# Patient Record
Sex: FEMALE | Race: WHITE | URBAN - METROPOLITAN AREA
[De-identification: names, ages, dates, MRNs, and addresses within clinical notes are randomized per-mention and may not be internally consistent; named-entity substitution may affect disease eponyms.]

---

## 2021-10-05 LAB
ABO/RH: NORMAL
ANTIBODY SCREEN: NORMAL
HEPATITIS C ANTIBODY INTERPRETATION: NORMAL

## 2021-10-06 LAB
BASOPHILS ABSOLUTE: 0 K/UL (ref 0–0.2)
BASOPHILS RELATIVE PERCENT: 0.2 %
EOSINOPHILS ABSOLUTE: 0 K/UL (ref 0–0.6)
EOSINOPHILS RELATIVE PERCENT: 0.4 %
HCT VFR BLD CALC: 43.7 % (ref 36–48)
HEMOGLOBIN: 14.6 G/DL (ref 12–16)
HEPATITIS B SURFACE ANTIGEN INTERPRETATION: ABNORMAL
HIV AG/AB: NORMAL
HIV ANTIGEN: NORMAL
HIV-1 ANTIBODY: NORMAL
HIV-2 AB: NORMAL
LYMPHOCYTES ABSOLUTE: 2.3 K/UL (ref 1–5.1)
LYMPHOCYTES RELATIVE PERCENT: 19.6 %
MCH RBC QN AUTO: 30.4 PG (ref 26–34)
MCHC RBC AUTO-ENTMCNC: 33.3 G/DL (ref 31–36)
MCV RBC AUTO: 91.2 FL (ref 80–100)
MONOCYTES ABSOLUTE: 0.9 K/UL (ref 0–1.3)
MONOCYTES RELATIVE PERCENT: 8 %
NEUTROPHILS ABSOLUTE: 8.3 K/UL (ref 1.7–7.7)
NEUTROPHILS RELATIVE PERCENT: 71.8 %
PDW BLD-RTO: 12 % (ref 12.4–15.4)
PLATELET # BLD: 179 K/UL (ref 135–450)
PMV BLD AUTO: 9.2 FL (ref 5–10.5)
RBC # BLD: 4.79 M/UL (ref 4–5.2)
RUBELLA ANTIBODY IGG: 153 IU/ML
TOTAL SYPHILLIS IGG/IGM: ABNORMAL
URINE CULTURE, ROUTINE: NORMAL
WBC # BLD: 11.6 K/UL (ref 4–11)

## 2022-05-05 LAB — GBS, EXTERNAL RESULT: NOT DETECTED

## 2022-05-26 ENCOUNTER — ANESTHESIA (OUTPATIENT)
Dept: LABOR AND DELIVERY | Age: 22
End: 2022-05-26

## 2022-05-26 ENCOUNTER — ANESTHESIA EVENT (OUTPATIENT)
Dept: LABOR AND DELIVERY | Age: 22
End: 2022-05-26
Payer: COMMERCIAL

## 2022-05-26 ENCOUNTER — HOSPITAL ENCOUNTER (INPATIENT)
Age: 22
LOS: 2 days | Discharge: HOME | End: 2022-05-28
Attending: OBSTETRICS & GYNECOLOGY | Admitting: OBSTETRICS & GYNECOLOGY
Payer: COMMERCIAL

## 2022-05-26 ENCOUNTER — ANESTHESIA EVENT (OUTPATIENT)
Dept: LABOR AND DELIVERY | Age: 22
End: 2022-05-26

## 2022-05-26 ENCOUNTER — ANESTHESIA (OUTPATIENT)
Dept: LABOR AND DELIVERY | Age: 22
End: 2022-05-26
Payer: COMMERCIAL

## 2022-05-26 PROBLEM — Z37.9 NORMAL LABOR: Status: ACTIVE | Noted: 2022-05-26

## 2022-05-26 LAB
ABO/RH: NORMAL
ABO/RH: NORMAL
AMPHETAMINE SCREEN, URINE: NORMAL
ANTIBODY SCREEN: NORMAL
BARBITURATE SCREEN URINE: NORMAL
BASOPHILS ABSOLUTE: 0 K/UL (ref 0–0.2)
BASOPHILS RELATIVE PERCENT: 0.3 %
BENZODIAZEPINE SCREEN, URINE: NORMAL
BUPRENORPHINE URINE: NORMAL
CANNABINOID SCREEN URINE: NORMAL
COCAINE METABOLITE SCREEN URINE: NORMAL
EOSINOPHILS ABSOLUTE: 0.1 K/UL (ref 0–0.6)
EOSINOPHILS RELATIVE PERCENT: 0.5 %
HCT VFR BLD CALC: 34.5 % (ref 36–48)
HEMOGLOBIN: 11.2 G/DL (ref 12–16)
LYMPHOCYTES ABSOLUTE: 1.6 K/UL (ref 1–5.1)
LYMPHOCYTES RELATIVE PERCENT: 12.5 %
Lab: NORMAL
MCH RBC QN AUTO: 28 PG (ref 26–34)
MCHC RBC AUTO-ENTMCNC: 32.6 G/DL (ref 31–36)
MCV RBC AUTO: 85.8 FL (ref 80–100)
METHADONE SCREEN, URINE: NORMAL
MONOCYTES ABSOLUTE: 1.2 K/UL (ref 0–1.3)
MONOCYTES RELATIVE PERCENT: 9.4 %
NEUTROPHILS ABSOLUTE: 10.2 K/UL (ref 1.7–7.7)
NEUTROPHILS RELATIVE PERCENT: 77.3 %
OPIATE SCREEN URINE: NORMAL
OXYCODONE URINE: NORMAL
PDW BLD-RTO: 13.5 % (ref 12.4–15.4)
PH UA: 6
PHENCYCLIDINE SCREEN URINE: NORMAL
PLATELET # BLD: 160 K/UL (ref 135–450)
PMV BLD AUTO: 9.6 FL (ref 5–10.5)
PROPOXYPHENE SCREEN: NORMAL
RBC # BLD: 4.02 M/UL (ref 4–5.2)
WBC # BLD: 13.2 K/UL (ref 4–11)

## 2022-05-26 PROCEDURE — 86850 RBC ANTIBODY SCREEN: CPT

## 2022-05-26 PROCEDURE — 86900 BLOOD TYPING SEROLOGIC ABO: CPT

## 2022-05-26 PROCEDURE — 1220000000 HC SEMI PRIVATE OB R&B

## 2022-05-26 PROCEDURE — 6370000000 HC RX 637 (ALT 250 FOR IP): Performed by: OBSTETRICS & GYNECOLOGY

## 2022-05-26 PROCEDURE — 3700000025 EPIDURAL BLOCK: Performed by: ANESTHESIOLOGY

## 2022-05-26 PROCEDURE — 85025 COMPLETE CBC W/AUTO DIFF WBC: CPT

## 2022-05-26 PROCEDURE — 86780 TREPONEMA PALLIDUM: CPT

## 2022-05-26 PROCEDURE — 2580000003 HC RX 258: Performed by: OBSTETRICS & GYNECOLOGY

## 2022-05-26 PROCEDURE — 2500000003 HC RX 250 WO HCPCS: Performed by: NURSE ANESTHETIST, CERTIFIED REGISTERED

## 2022-05-26 PROCEDURE — 2500000003 HC RX 250 WO HCPCS: Performed by: ANESTHESIOLOGY

## 2022-05-26 PROCEDURE — 80307 DRUG TEST PRSMV CHEM ANLYZR: CPT

## 2022-05-26 PROCEDURE — 6360000002 HC RX W HCPCS: Performed by: OBSTETRICS & GYNECOLOGY

## 2022-05-26 PROCEDURE — 86901 BLOOD TYPING SEROLOGIC RH(D): CPT

## 2022-05-26 RX ORDER — SODIUM CHLORIDE, SODIUM LACTATE, POTASSIUM CHLORIDE, CALCIUM CHLORIDE 600; 310; 30; 20 MG/100ML; MG/100ML; MG/100ML; MG/100ML
INJECTION, SOLUTION INTRAVENOUS CONTINUOUS
Status: DISCONTINUED | OUTPATIENT
Start: 2022-05-26 | End: 2022-05-27

## 2022-05-26 RX ORDER — DOCUSATE SODIUM 100 MG/1
100 CAPSULE, LIQUID FILLED ORAL 2 TIMES DAILY
Status: DISCONTINUED | OUTPATIENT
Start: 2022-05-26 | End: 2022-05-27

## 2022-05-26 RX ORDER — SODIUM CHLORIDE, SODIUM LACTATE, POTASSIUM CHLORIDE, AND CALCIUM CHLORIDE .6; .31; .03; .02 G/100ML; G/100ML; G/100ML; G/100ML
500 INJECTION, SOLUTION INTRAVENOUS PRN
Status: DISCONTINUED | OUTPATIENT
Start: 2022-05-26 | End: 2022-05-27

## 2022-05-26 RX ORDER — SODIUM CHLORIDE, SODIUM LACTATE, POTASSIUM CHLORIDE, AND CALCIUM CHLORIDE .6; .31; .03; .02 G/100ML; G/100ML; G/100ML; G/100ML
1000 INJECTION, SOLUTION INTRAVENOUS PRN
Status: DISCONTINUED | OUTPATIENT
Start: 2022-05-26 | End: 2022-05-27

## 2022-05-26 RX ORDER — BUTORPHANOL TARTRATE 1 MG/ML
1 INJECTION, SOLUTION INTRAMUSCULAR; INTRAVENOUS ONCE
Status: COMPLETED | OUTPATIENT
Start: 2022-05-26 | End: 2022-05-26

## 2022-05-26 RX ORDER — LIDOCAINE HYDROCHLORIDE AND EPINEPHRINE 15; 5 MG/ML; UG/ML
INJECTION, SOLUTION EPIDURAL PRN
Status: DISCONTINUED | OUTPATIENT
Start: 2022-05-26 | End: 2022-05-27 | Stop reason: SDUPTHER

## 2022-05-26 RX ORDER — ONDANSETRON 2 MG/ML
4 INJECTION INTRAMUSCULAR; INTRAVENOUS EVERY 6 HOURS PRN
Status: DISCONTINUED | OUTPATIENT
Start: 2022-05-26 | End: 2022-05-27

## 2022-05-26 RX ORDER — SODIUM CHLORIDE 9 MG/ML
25 INJECTION, SOLUTION INTRAVENOUS PRN
Status: DISCONTINUED | OUTPATIENT
Start: 2022-05-26 | End: 2022-05-27

## 2022-05-26 RX ORDER — SODIUM CHLORIDE 0.9 % (FLUSH) 0.9 %
5-40 SYRINGE (ML) INJECTION EVERY 12 HOURS SCHEDULED
Status: DISCONTINUED | OUTPATIENT
Start: 2022-05-26 | End: 2022-05-27

## 2022-05-26 RX ORDER — SODIUM CHLORIDE 0.9 % (FLUSH) 0.9 %
5-40 SYRINGE (ML) INJECTION PRN
Status: DISCONTINUED | OUTPATIENT
Start: 2022-05-26 | End: 2022-05-27

## 2022-05-26 RX ORDER — NALOXONE HYDROCHLORIDE 0.4 MG/ML
INJECTION, SOLUTION INTRAMUSCULAR; INTRAVENOUS; SUBCUTANEOUS PRN
Status: DISCONTINUED | OUTPATIENT
Start: 2022-05-26 | End: 2022-05-27

## 2022-05-26 RX ADMIN — SODIUM CHLORIDE, POTASSIUM CHLORIDE, SODIUM LACTATE AND CALCIUM CHLORIDE: 600; 310; 30; 20 INJECTION, SOLUTION INTRAVENOUS at 17:05

## 2022-05-26 RX ADMIN — SODIUM CHLORIDE, POTASSIUM CHLORIDE, SODIUM LACTATE AND CALCIUM CHLORIDE: 600; 310; 30; 20 INJECTION, SOLUTION INTRAVENOUS at 09:36

## 2022-05-26 RX ADMIN — Medication 12 ML/HR: at 20:03

## 2022-05-26 RX ADMIN — Medication 25 MCG: at 13:47

## 2022-05-26 RX ADMIN — SODIUM CHLORIDE, POTASSIUM CHLORIDE, SODIUM LACTATE AND CALCIUM CHLORIDE: 600; 310; 30; 20 INJECTION, SOLUTION INTRAVENOUS at 21:47

## 2022-05-26 RX ADMIN — LIDOCAINE HYDROCHLORIDE AND EPINEPHRINE 3 ML: 15; 5 INJECTION, SOLUTION EPIDURAL at 19:54

## 2022-05-26 RX ADMIN — Medication 25 MCG: at 09:37

## 2022-05-26 RX ADMIN — BUTORPHANOL TARTRATE 1 MG: 1 INJECTION, SOLUTION INTRAMUSCULAR; INTRAVENOUS at 18:15

## 2022-05-26 RX ADMIN — LIDOCAINE HYDROCHLORIDE AND EPINEPHRINE 2 ML: 15; 5 INJECTION, SOLUTION EPIDURAL at 20:00

## 2022-05-26 ASSESSMENT — PAIN DESCRIPTION - DESCRIPTORS
DESCRIPTORS: CRAMPING
DESCRIPTORS: CRAMPING;DISCOMFORT;PRESSURE
DESCRIPTORS: OTHER (COMMENT)
DESCRIPTORS: CRAMPING

## 2022-05-26 ASSESSMENT — ENCOUNTER SYMPTOMS: SHORTNESS OF BREATH: 0

## 2022-05-26 ASSESSMENT — PAIN SCALES - GENERAL: PAINLEVEL_OUTOF10: 8

## 2022-05-26 NOTE — H&P
Department of Obstetrics and Gynecology   Obstetrics History and Physical        CHIEF COMPLAINT:  Here for IOL    HISTORY OF PRESENT ILLNESS:      The patient is a 24 y.o. female at 39w0d. OB History        1    Para        Term                AB        Living           SAB        IAB        Ectopic        Molar        Multiple        Live Births                Patient presents with a chief complaint as above and is being admitted for induction    Estimated Due Date: Estimated Date of Delivery: 22    PRENATAL CARE:    Complicated by: none    PAST OB HISTORY:  OB History        1    Para        Term                AB        Living           SAB        IAB        Ectopic        Molar        Multiple        Live Births                    Past Medical History:    History reviewed. No pertinent past medical history. Past Surgical History:    History reviewed. No pertinent surgical history. Allergies:  Patient has no known allergies. Social History:    Social History     Socioeconomic History    Marital status: Single     Spouse name: Not on file    Number of children: Not on file    Years of education: Not on file    Highest education level: Not on file   Occupational History    Not on file   Tobacco Use    Smoking status: Never Smoker    Smokeless tobacco: Never Used   Vaping Use    Vaping Use: Former   Substance and Sexual Activity    Alcohol use: No    Drug use: No    Sexual activity: Yes     Partners: Male   Other Topics Concern    Not on file   Social History Narrative    Not on file     Social Determinants of Health     Financial Resource Strain:     Difficulty of Paying Living Expenses: Not on file   Food Insecurity:     Worried About Running Out of Food in the Last Year: Not on file    [place] of Food in the Last Year: Not on file   Transportation Needs:     Lack of Transportation (Medical): Not on file    Lack of Transportation (Non-Medical):  Not on file Physical Activity:     Days of Exercise per Week: Not on file    Minutes of Exercise per Session: Not on file   Stress:     Feeling of Stress : Not on file   Social Connections:     Frequency of Communication with Friends and Family: Not on file    Frequency of Social Gatherings with Friends and Family: Not on file    Attends Sabianist Services: Not on file    Active Member of [address] [place] [address] or Organizations: Not on file    Attends Club or Organization Meetings: Not on file    Marital Status: Not on file   Intimate Partner Violence:     Fear of Current or Ex-Partner: Not on file    Emotionally Abused: Not on file    Physically Abused: Not on file    Sexually Abused: Not on file   Housing Stability:     Unable to Pay for Housing in the Last Year: Not on file    Number of [place] in the Last Year: Not on file    Unstable Housing in the Last Year: Not on file     Family History:       Problem Relation Age of Onset    Asthma Father     Obesity Father      Medications Prior to Admission:  Medications Prior to Admission: ondansetron (ZOFRAN ODT) 4 MG disintegrating tablet, Take 1 tablet by mouth every 8 hours as needed for Nausea (Patient not taking: Reported on 5/26/2022)    REVIEW OF SYSTEMS:        PHYSICAL EXAM:  Vitals:    05/26/22 0816 05/26/22 0818 05/26/22 0852   BP: 122/82  130/83   Pulse: (!) 107  93   Resp: 17  18   Temp: 97.3 °F (36.3 °C)  98 °F (36.7 °C)   TempSrc: Temporal  Oral   SpO2: 97%  98%   Weight:  155 lb (70.3 kg)    Height:  5' 2\" (1.575 m)      General appearance:  awake, alert, cooperative, no apparent distress, and appears stated age  Neurologic:  Awake, alert, oriented to name, place and time. Lungs:  No increased work of breathing, good air exchange  Abdomen:  Soft, non tender, gravid, consistent with her gestational age, EFW by Leopold's maneuver was 3100 gms   Fetal heart rate:  Reassuring.   Pelvis:  Adequate pelvis  Cervix: closed/50  Contraction frequency:    Membranes: Intact    Labs:   CBC:   Lab Results   Component Value Date    WBC 14.1 02/07/2022    WBC 11.6 10/05/2021    RBC 3.83 02/07/2022    HGB 11.7 02/07/2022    HCT 34.9 02/07/2022    MCV 91.1 02/07/2022    MCH 30.5 02/07/2022    MCHC 33.5 02/07/2022    RDW 12.0 02/07/2022     02/07/2022    MPV 11.9 02/07/2022       ASSESSMENT AND PLAN:    IOL:  Admit, start with cervical ripening( Cytotec), anticipate normal delivery, routine labor orders  Fetus: Reassuring  GBS: No  Other: epidural

## 2022-05-26 NOTE — ANESTHESIA PRE PROCEDURE
Department of Anesthesiology  Preprocedure Note       Name:  Bruno Morgan   Age:  24 y.o.  :  2000                                          MRN:  [id number]         Date:  2022      Surgeon: * No surgeons listed *    Procedure: * No procedures listed *    Medications prior to admission:   Prior to Admission medications    Medication Sig Start Date End Date Taking?  Authorizing Provider   ondansetron (ZOFRAN ODT) 4 MG disintegrating tablet Take 1 tablet by mouth every 8 hours as needed for Nausea  Patient not taking: Reported on 2022   Sarah Miller PA-C       Current medications:    Current Facility-Administered Medications   Medication Dose Route Frequency Provider Last Rate Last Admin    lactated ringers infusion   IntraVENous Continuous Kenney Jamil  mL/hr at 22 0936 New Bag at 22 0936    lactated ringers bolus  500 mL IntraVENous PRN Kenney Jamil MD        Or    lactated ringers bolus  1,000 mL IntraVENous PRN Kenney Jamil MD        sodium chloride flush 0.9 % injection 5-40 mL  5-40 mL IntraVENous 2 times per day Kenney Jamil MD        sodium chloride flush 0.9 % injection 5-40 mL  5-40 mL IntraVENous PRN Jean Portillo MD        0.9 % sodium chloride infusion  25 mL IntraVENous PRN Kenney Jamil MD        oxytocin (PITOCIN) 30 units in 500 mL infusion  87.3 rose-units/min IntraVENous Continuous PRN Kenney Jamil MD        And    oxytocin (PITOCIN) 10 unit bolus from the bag  10 Units IntraVENous PRN Kenney Jamil MD        ondansetron (ZOFRAN) injection 4 mg  4 mg IntraVENous Q6H PRN Kenney Jamil MD        docusate sodium (COLACE) capsule 100 mg  100 mg Oral BID Kenney Jamil MD        miSOPROStol (CYTOTEC) pre-split tablet TABS 25 mcg  25 mcg Vaginal Q4H Kenney Jamil MD   25 mcg at 22 8268       Allergies:  No Known Allergies    Problem List:    Patient Active Problem List   Diagnosis Code  Normal labor O80, Z37.9       Past Medical History:  History reviewed. No pertinent past medical history. Past Surgical History:  History reviewed. No pertinent surgical history. Social History:    Social History     Tobacco Use    Smoking status: Never Smoker    Smokeless tobacco: Never Used   Substance Use Topics    Alcohol use: No                                Counseling given: Not Answered      Vital Signs (Current):   Vitals:    05/26/22 0816 05/26/22 0818 05/26/22 0852 05/26/22 0937   BP: 122/82  130/83 117/72   Pulse: (!) 107  93 84   Resp: 17  18 16   Temp: 36.3 °C (97.3 °F)  36.7 °C (98 °F) 36.6 °C (97.8 °F)   TempSrc: Temporal  Oral Oral   SpO2: 97%  98%    Weight:  155 lb (70.3 kg)     Height:  5' 2\" (1.575 m)                                                BP Readings from Last 3 Encounters:   05/26/22 117/72   06/07/18 112/69   09/28/16 119/61 (84 %, Z = 0.99 /  34 %, Z = -0.41)*     *BP percentiles are based on the 2017 AAP Clinical Practice Guideline for girls       NPO Status:                                                                                 BMI:   Wt Readings from Last 3 Encounters:   05/26/22 155 lb (70.3 kg)   06/07/18 104 lb 6 oz (47.3 kg) (9 %, Z= -1.36)*   09/28/16 115 lb (52.2 kg) (35 %, Z= -0.38)*     * Growth percentiles are based on CDC (Girls, 2-20 Years) data. Body mass index is 28.35 kg/m².     CBC:   Lab Results   Component Value Date    WBC 13.2 05/26/2022    RBC 4.02 05/26/2022    HGB 11.2 05/26/2022    HCT 34.5 05/26/2022    MCV 85.8 05/26/2022    RDW 13.5 05/26/2022     05/26/2022       CMP:   Lab Results   Component Value Date     06/07/2018    K 4.0 06/07/2018    CL 96 06/07/2018    CO2 19 06/07/2018    BUN 9 06/07/2018    CREATININE 0.7 06/07/2018    GFRAA >60 06/07/2018    AGRATIO 1.0 06/07/2018    LABGLOM >60 06/07/2018    GLUCOSE 104 06/07/2018    PROT 8.2 06/07/2018    CALCIUM 9.7 06/07/2018    BILITOT 0.5 06/07/2018    ALKPHOS 95 06/07/2018    AST 17 06/07/2018    ALT 6 06/07/2018       POC Tests: No results for input(s): POCGLU, POCNA, POCK, POCCL, POCBUN, POCHEMO, POCHCT in the last 72 hours.     Coags: No results found for: PROTIME, INR, APTT    HCG (If Applicable):   Lab Results   Component Value Date    PREGTESTUR Negative 06/07/2018        ABGs: No results found for: PHART, PO2ART, OHI6WPL, URD2QHJ, BEART, J6YVGZHJ     Type & Screen (If Applicable):  No results found for: LABABO, LABRH    Drug/Infectious Status (If Applicable):  No results found for: HIV, HEPCAB    COVID-19 Screening (If Applicable): No results found for: [address] [place]        Anesthesia Evaluation     Anesthesia Plan        BREN Beal CRNA   5/26/2022

## 2022-05-26 NOTE — ANESTHESIA PRE PROCEDURE
Department of Anesthesiology  Preprocedure Note       Name:  Chadd Garcia   Age:  24 y.o.  :  2000                                          MRN:  [id number]         Date:  2022      Surgeon: * Surgery not found *    Procedure:     Medications prior to admission:   Prior to Admission medications    Medication Sig Start Date End Date Taking?  Authorizing Provider   ondansetron (ZOFRAN ODT) 4 MG disintegrating tablet Take 1 tablet by mouth every 8 hours as needed for Nausea  Patient not taking: Reported on 2022   Emma Banuelos PA-C       Current medications:    Current Facility-Administered Medications   Medication Dose Route Frequency Provider Last Rate Last Admin    lactated ringers infusion   IntraVENous Continuous Ruth Joseph  mL/hr at 22 0936 New Bag at 22 0936    lactated ringers bolus  500 mL IntraVENous PRN Ruth Joseph MD        Or    lactated ringers bolus  1,000 mL IntraVENous PRN Ruth Joseph MD        sodium chloride flush 0.9 % injection 5-40 mL  5-40 mL IntraVENous 2 times per day Ruth Joseph MD        sodium chloride flush 0.9 % injection 5-40 mL  5-40 mL IntraVENous PRN Keny Portillo MD        0.9 % sodium chloride infusion  25 mL IntraVENous PRN Ruth Joseph MD        oxytocin (PITOCIN) 30 units in 500 mL infusion  87.3 rose-units/min IntraVENous Continuous PRN Ruth Joseph MD        And    oxytocin (PITOCIN) 10 unit bolus from the bag  10 Units IntraVENous PRN Ruth Joseph MD        ondansetron (ZOFRAN) injection 4 mg  4 mg IntraVENous Q6H PRN Ruth Joseph MD        docusate sodium (COLACE) capsule 100 mg  100 mg Oral BID Ruth Joseph MD        miSOPROStol (CYTOTEC) pre-split tablet TABS 25 mcg  25 mcg Vaginal Q4H Ruth Joseph MD   25 mcg at 22 0694       Allergies:  No Known Allergies    Problem List:    Patient Active Problem List   Diagnosis Code    Normal labor O80, Z37.9       Past Medical History:  History reviewed. No pertinent past medical history. Past Surgical History:  History reviewed. No pertinent surgical history. Social History:    Social History     Tobacco Use    Smoking status: Never Smoker    Smokeless tobacco: Never Used   Substance Use Topics    Alcohol use: No                                Counseling given: Not Answered      Vital Signs (Current):   Vitals:    05/26/22 0816 05/26/22 0818 05/26/22 0852 05/26/22 0937   BP: 122/82  130/83 117/72   Pulse: (!) 107  93 84   Resp: 17 18 16   Temp: 36.3 °C (97.3 °F)  36.7 °C (98 °F) 36.6 °C (97.8 °F)   TempSrc: Temporal  Oral Oral   SpO2: 97%  98%    Weight:  155 lb (70.3 kg)     Height:  5' 2\" (1.575 m)                                                BP Readings from Last 3 Encounters:   05/26/22 117/72   06/07/18 112/69   09/28/16 119/61 (84 %, Z = 0.99 /  34 %, Z = -0.41)*     *BP percentiles are based on the 2017 AAP Clinical Practice Guideline for girls       NPO Status:                                                                                 BMI:   Wt Readings from Last 3 Encounters:   05/26/22 155 lb (70.3 kg)   06/07/18 104 lb 6 oz (47.3 kg) (9 %, Z= -1.36)*   09/28/16 115 lb (52.2 kg) (35 %, Z= -0.38)*     * Growth percentiles are based on CDC (Girls, 2-20 Years) data. Body mass index is 28.35 kg/m².     CBC:   Lab Results   Component Value Date    WBC 13.2 05/26/2022    RBC 4.02 05/26/2022    HGB 11.2 05/26/2022    HCT 34.5 05/26/2022    MCV 85.8 05/26/2022    RDW 13.5 05/26/2022     05/26/2022       CMP:   Lab Results   Component Value Date     06/07/2018    K 4.0 06/07/2018    CL 96 06/07/2018    CO2 19 06/07/2018    BUN 9 06/07/2018    CREATININE 0.7 06/07/2018    GFRAA >60 06/07/2018    AGRATIO 1.0 06/07/2018    LABGLOM >60 06/07/2018    GLUCOSE 104 06/07/2018    PROT 8.2 06/07/2018    CALCIUM 9.7 06/07/2018    BILITOT 0.5 06/07/2018    ALKPHOS 95 06/07/2018    AST 17 06/07/2018    ALT 6 06/07/2018       POC Tests: No results for input(s): POCGLU, POCNA, POCK, POCCL, POCBUN, POCHEMO, POCHCT in the last 72 hours. Coags: No results found for: PROTIME, INR, APTT    HCG (If Applicable):   Lab Results   Component Value Date    PREGTESTUR Negative 06/07/2018        ABGs: No results found for: PHART, PO2ART, MJY3UQF, OOZ2CNF, BEART, Q8OJGZSP     Type & Screen (If Applicable):  No results found for: LABABO, LABRH    Drug/Infectious Status (If Applicable):  No results found for: HIV, HEPCAB    COVID-19 Screening (If Applicable): No results found for: COVID19        Anesthesia Evaluation  Patient summary reviewed and Nursing notes reviewed no history of anesthetic complications:   Airway: Mallampati: II  TM distance: >3 FB   Neck ROM: full  Mouth opening: > = 3 FB   Dental: normal exam         Pulmonary:normal exam        (-) pneumonia, COPD, asthma, shortness of breath, recent URI and sleep apnea                           Cardiovascular:  Exercise tolerance: good (>4 METS),       (-) pacemaker, hypertension, valvular problems/murmurs, past MI, CAD, CABG/stent, dysrhythmias,  angina,  CHF, orthopnea, PND,  MERINO, no pulmonary hypertension and no hyperlipidemia                Neuro/Psych:      (-) seizures, neuromuscular disease, TIA, CVA, headaches and psychiatric history           GI/Hepatic/Renal:        (-) hiatal hernia, GERD, PUD, hepatitis, liver disease, no renal disease and bowel prep       Endo/Other:        (-) hypothyroidism, hyperthyroidism, blood dyscrasia, arthritis               Abdominal:             Vascular: Other Findings:           Anesthesia Plan      epidural     ASA 2             Anesthetic plan and risks discussed with patient. Use of blood products discussed with patient whom consented to blood products. Plan discussed with CRNA and attending.     Attending anesthesiologist reviewed and agrees with Preprocedure content                Meredith Hi, BREN - CRNA   5/26/2022

## 2022-05-27 LAB — TOTAL SYPHILLIS IGG/IGM: NORMAL

## 2022-05-27 PROCEDURE — 3E0P7VZ INTRODUCTION OF HORMONE INTO FEMALE REPRODUCTIVE, VIA NATURAL OR ARTIFICIAL OPENING: ICD-10-PCS | Performed by: OBSTETRICS & GYNECOLOGY

## 2022-05-27 PROCEDURE — 7200000001 HC VAGINAL DELIVERY

## 2022-05-27 PROCEDURE — 1220000000 HC SEMI PRIVATE OB R&B

## 2022-05-27 PROCEDURE — 6370000000 HC RX 637 (ALT 250 FOR IP): Performed by: OBSTETRICS & GYNECOLOGY

## 2022-05-27 PROCEDURE — 6360000002 HC RX W HCPCS: Performed by: OBSTETRICS & GYNECOLOGY

## 2022-05-27 PROCEDURE — 0UQMXZZ REPAIR VULVA, EXTERNAL APPROACH: ICD-10-PCS | Performed by: OBSTETRICS & GYNECOLOGY

## 2022-05-27 RX ORDER — SODIUM CHLORIDE 0.9 % (FLUSH) 0.9 %
5-40 SYRINGE (ML) INJECTION EVERY 12 HOURS SCHEDULED
Status: DISCONTINUED | OUTPATIENT
Start: 2022-05-27 | End: 2022-05-28 | Stop reason: HOSPADM

## 2022-05-27 RX ORDER — OXYCODONE HYDROCHLORIDE 5 MG/1
5 TABLET ORAL EVERY 6 HOURS PRN
Qty: 16 TABLET | Refills: 0 | Status: SHIPPED | OUTPATIENT
Start: 2022-05-27 | End: 2022-05-30

## 2022-05-27 RX ORDER — MODIFIED LANOLIN
OINTMENT (GRAM) TOPICAL PRN
Status: DISCONTINUED | OUTPATIENT
Start: 2022-05-27 | End: 2022-05-28 | Stop reason: HOSPADM

## 2022-05-27 RX ORDER — FERROUS SULFATE 325(65) MG
325 TABLET ORAL 2 TIMES DAILY WITH MEALS
Status: DISCONTINUED | OUTPATIENT
Start: 2022-05-27 | End: 2022-05-28 | Stop reason: HOSPADM

## 2022-05-27 RX ORDER — POLYETHYLENE GLYCOL 3350 17 G/17G
17 POWDER, FOR SOLUTION ORAL DAILY PRN
Status: DISCONTINUED | OUTPATIENT
Start: 2022-05-27 | End: 2022-05-28 | Stop reason: HOSPADM

## 2022-05-27 RX ORDER — IBUPROFEN 800 MG/1
800 TABLET ORAL EVERY 8 HOURS
Status: DISCONTINUED | OUTPATIENT
Start: 2022-05-27 | End: 2022-05-28 | Stop reason: HOSPADM

## 2022-05-27 RX ORDER — SODIUM CHLORIDE 9 MG/ML
INJECTION, SOLUTION INTRAVENOUS PRN
Status: DISCONTINUED | OUTPATIENT
Start: 2022-05-27 | End: 2022-05-28 | Stop reason: HOSPADM

## 2022-05-27 RX ORDER — IBUPROFEN 800 MG/1
800 TABLET ORAL EVERY 8 HOURS PRN
Qty: 40 TABLET | Refills: 1 | Status: SHIPPED | OUTPATIENT
Start: 2022-05-27

## 2022-05-27 RX ORDER — OXYCODONE HYDROCHLORIDE 5 MG/1
5 TABLET ORAL EVERY 4 HOURS PRN
Status: DISCONTINUED | OUTPATIENT
Start: 2022-05-27 | End: 2022-05-28 | Stop reason: HOSPADM

## 2022-05-27 RX ORDER — ACETAMINOPHEN 500 MG
1000 TABLET ORAL EVERY 8 HOURS
Status: DISCONTINUED | OUTPATIENT
Start: 2022-05-27 | End: 2022-05-28 | Stop reason: HOSPADM

## 2022-05-27 RX ORDER — OXYCODONE HYDROCHLORIDE 5 MG/1
10 TABLET ORAL EVERY 4 HOURS PRN
Status: DISCONTINUED | OUTPATIENT
Start: 2022-05-27 | End: 2022-05-28 | Stop reason: HOSPADM

## 2022-05-27 RX ORDER — SODIUM CHLORIDE 0.9 % (FLUSH) 0.9 %
5-40 SYRINGE (ML) INJECTION PRN
Status: DISCONTINUED | OUTPATIENT
Start: 2022-05-27 | End: 2022-05-28 | Stop reason: HOSPADM

## 2022-05-27 RX ORDER — DOCUSATE SODIUM 100 MG/1
100 CAPSULE, LIQUID FILLED ORAL 2 TIMES DAILY
Status: DISCONTINUED | OUTPATIENT
Start: 2022-05-27 | End: 2022-05-28 | Stop reason: HOSPADM

## 2022-05-27 RX ORDER — ACETAMINOPHEN 500 MG
1000 TABLET ORAL 3 TIMES DAILY
Qty: 60 TABLET | Refills: 1 | COMMUNITY
Start: 2022-05-27

## 2022-05-27 RX ORDER — ONDANSETRON 2 MG/ML
4 INJECTION INTRAMUSCULAR; INTRAVENOUS EVERY 6 HOURS PRN
Status: DISCONTINUED | OUTPATIENT
Start: 2022-05-27 | End: 2022-05-28 | Stop reason: HOSPADM

## 2022-05-27 RX ADMIN — DOCUSATE SODIUM 100 MG: 100 CAPSULE, LIQUID FILLED ORAL at 10:52

## 2022-05-27 RX ADMIN — ACETAMINOPHEN 1000 MG: 500 TABLET ORAL at 05:48

## 2022-05-27 RX ADMIN — IBUPROFEN 800 MG: 800 TABLET, FILM COATED ORAL at 19:15

## 2022-05-27 RX ADMIN — IBUPROFEN 800 MG: 800 TABLET, FILM COATED ORAL at 02:06

## 2022-05-27 RX ADMIN — ACETAMINOPHEN 1000 MG: 500 TABLET ORAL at 23:42

## 2022-05-27 RX ADMIN — ACETAMINOPHEN 1000 MG: 500 TABLET ORAL at 15:31

## 2022-05-27 RX ADMIN — Medication 909.1 MILLI-UNITS/MIN: at 00:13

## 2022-05-27 RX ADMIN — DOCUSATE SODIUM 100 MG: 100 CAPSULE, LIQUID FILLED ORAL at 23:41

## 2022-05-27 RX ADMIN — IBUPROFEN 800 MG: 800 TABLET, FILM COATED ORAL at 10:52

## 2022-05-27 ASSESSMENT — PAIN SCALES - GENERAL
PAINLEVEL_OUTOF10: 0
PAINLEVEL_OUTOF10: 4
PAINLEVEL_OUTOF10: 1
PAINLEVEL_OUTOF10: 0
PAINLEVEL_OUTOF10: 2

## 2022-05-27 ASSESSMENT — PAIN DESCRIPTION - DESCRIPTORS
DESCRIPTORS: DISCOMFORT
DESCRIPTORS: SORE

## 2022-05-27 ASSESSMENT — PAIN DESCRIPTION - LOCATION: LOCATION: VAGINA

## 2022-05-27 ASSESSMENT — PAIN - FUNCTIONAL ASSESSMENT
PAIN_FUNCTIONAL_ASSESSMENT: ACTIVITIES ARE NOT PREVENTED
PAIN_FUNCTIONAL_ASSESSMENT: ACTIVITIES ARE NOT PREVENTED

## 2022-05-27 NOTE — FLOWSHEET NOTE
Patient actively pushing at [phone]. RN remains in continuous attendance at the bedside. Assessment & evaluation of fetal heart rate ongoing via continuous EFM. RN remained at bedside throughout pushing. EFM continuously assessed. Vaginal delivery of viable infant at [facility].

## 2022-05-27 NOTE — DISCHARGE SUMMARY
Obstetrical Discharge Form    Gestational Age: 39w1d    Antepartum complications: none    Date of Delivery: 22      Type of Delivery: vaginal, spontaneous ; repair of left labial laceration  Delivered By: Carla Coelho     Baby:      Information for the patient's :  Bhavesh Oviedo [[id number]]   APGAR One: 8     Information for the patient's :  Bhavesh Oviedo [[id number]]   APGAR Five: 9     Information for the patient's :  Lolita Dumont [[id number]]   Birth Weight: 6 lb 2.9 oz (2.805 kg)       Anesthesia: Epidural    Intrapartum complications: tight nuchal cord x 1  Postpartum complications: none    Discharge Medication:      Medication List      START taking these medications    acetaminophen 500 MG tablet  Commonly known as: TYLENOL  Take 2 tablets by mouth 3 times daily     ibuprofen 800 MG tablet  Commonly known as: ADVIL;MOTRIN  Take 1 tablet by mouth every 8 hours as needed for Pain     oxyCODONE 5 MG immediate release tablet  Commonly known as: Roxicodone  Take 1 tablet by mouth every 6 hours as needed for Pain for up to 3 days. Intended supply: 3 days. Take lowest dose possible to manage pain        STOP taking these medications    ondansetron 4 MG disintegrating tablet  Commonly known as: Zofran ODT           Where to Get Your Medications      You can get these medications from any pharmacy    Bring a paper prescription for each of these medications  · ibuprofen 800 MG tablet  · oxyCODONE 5 MG immediate release tablet  You don't need a prescription for these medications  · acetaminophen 500 MG tablet         Discharge Condition:  stable    Discharge Date: 2022    PLAN:  Follow up in 6 weeks for routine PP visit  All questions answered  D/C summary begun at delivery for D/C planning purposes, any delay in discharge from ordered D/C date due to  factors.

## 2022-05-27 NOTE — L&D DELIVERY SUMMARY NOTE
Department of Obstetrics and Gynecology  Spontaneous Vaginal Delivery Note    Labor & Delivery Summary  Labor Onset Date: 22  Labor Onset Time: 174  Dilation Complete Date: 22  Dilation Complete Time:   OB Anesthesia Type: Epidural  Induction: Prostaglandins    Pre-operative Diagnosis:  39w1d IUP, induced labor    Post-operative Diagnosis:  Living  infant(s) and Female    Procedure:  Spontaneous vaginal delivery ; Repair left labial laceration     Surgeon:  Carla Coelho     Labor & Delivery Summary  Labor Onset Date: 22  Labor Onset Time: 174  Dilation Complete Date: 22  Dilation Complete Time:   OB Anesthesia Type: Epidural  Induction: Prostaglandins  Information for the patient's :  Bhavesh Oviedo [[id number]]   APGAR One: 8     Information for the patient's :  Bhavesh Oviedo [[id number]]   APGAR Five: 9     Information for the patient's :  Bhavesh Oviedo [[id number]]   Birth Weight: 6 lb 2.9 oz (2.805 kg)       Anesthesia:  epidural anesthesia    Estimated blood loss:  100 cc    Specimen:  Placenta not sent to pathology     Cord blood sent Yes    Complications:  Tight nuchal cord x 1    Condition:  infant  and mother stable    Details of Procedure: The patient is a 24 y.o. female at 36w3d   OB History        1    Para        Term                AB        Living           SAB        IAB        Ectopic        Molar        Multiple        Live Births                 who was admitted for induction. She received the following interventions: vaginal Cytotec. The patient progressed normally,did receive an epidural, became complete and started to push. After pushing for 30 minutes the infant was delivered  atraumatically and  placed on the maternal abdomen. A tight nuchal cord was clamped and cut on the perineum . The delivery of the placenta was spontaneous. The uterus was manually explored.   Routine cord blood and cord gases were sent. The perineum and vagina were explored and a left labial  laceration was repaired in standard fashion using 3-0 vicryl.

## 2022-05-27 NOTE — PROGRESS NOTES
[facility] post-partum note  Late entry  Post-partum Day #0    Subjective:    Breast feeding. Feels well. No complaints  Lochia: Normal    Objective:  Vitals:    05/27/22 0159 05/27/22 0214 05/27/22 0530 05/27/22 0930   BP: 118/77 118/76 125/74 113/77   Pulse: 96 85 98 90   Resp: 16 16 16 16   Temp:  97.9 °F (36.6 °C) 97.8 °F (36.6 °C) 97.6 °F (36.4 °C)   TempSrc:  Axillary Axillary Oral   SpO2:       Weight:       Height:           Physical Examination:  Appears well  Uterus: Firm, NT  Calves: NT    Labs:    Recent Labs     05/26/22  0930   WBC 13.2*   HGB 11.2*   HCT 34.5*        No results for input(s): NA, K, CL, CO2, BUN, CREATININE, CALCIUM, AST, ALT in the last 72 hours.     Invalid input(s): CHARLENE      Current Facility-Administered Medications:     sodium chloride flush 0.9 % injection 5-40 mL, 5-40 mL, IntraVENous, 2 times per day, Sarah Toro MD    sodium chloride flush 0.9 % injection 5-40 mL, 5-40 mL, IntraVENous, PRN, Sarah Toro MD    0.9 % sodium chloride infusion, , IntraVENous, PRN, Sarah Toro MD    acetaminophen (TYLENOL) tablet 1,000 mg, 1,000 mg, Oral, Q8H, Sarah Toro MD, 1,000 mg at 05/27/22 1531    oxyCODONE (ROXICODONE) immediate release tablet 5 mg, 5 mg, Oral, Q4H PRN **OR** oxyCODONE (ROXICODONE) immediate release tablet 10 mg, 10 mg, Oral, Q4H PRN, Sarah Toro MD    ondansetron (ZOFRAN) injection 4 mg, 4 mg, IntraVENous, Q6H PRN, Sarah Toro MD    docusate sodium (COLACE) capsule 100 mg, 100 mg, Oral, BID, Sarah Toro MD, 100 mg at 05/27/22 1052    polyethylene glycol (GLYCOLAX) packet 17 g, 17 g, Oral, Daily PRN, Sarah Toro MD    magnesium hydroxide (MILK OF MAGNESIA) 400 MG/5ML suspension 30 mL, 30 mL, Oral, Daily PRN, Sarah Toro MD    lansinoh lanolin ointment, , Topical, PRN, Sarah Sleeper, MD    witch hazel-glycerin (TUCKS) pad, , Topical, PRN, Sarah Toro MD    hydrocortisone 2.5 % cream, , Topical, Q2H PRN, Denise Rucker MD    benzocaine-menthol (DERMOPLAST) 20-0.5 % spray, , Topical, PRN, Denise Rucker MD    ferrous sulfate (IRON 325) tablet 325 mg, 325 mg, Oral, BID WC, Denise Rucker MD    Tetanus-Diphth-Acell Pertussis (BOOSTRIX) injection 0.5 mL, 0.5 mL, IntraMUSCular, Prior to discharge, Denise Rucker MD    ibuprofen (ADVIL;MOTRIN) tablet 800 mg, 800 mg, Oral, Q8H, Denise Rucker MD, 800 mg at 05/27/22 1052     Assessment/Plan:      Post Partum: advance Postpartum care  Discharge today no

## 2022-05-27 NOTE — ANESTHESIA PROCEDURE NOTES
Epidural Block    Patient location during procedure: OB  Start time: 2022 7:46 AM  End time: 2022 7:54 AM  Reason for block: labor epidural  Staffing  Performed: resident/CRNA   Anesthesiologist: Arcadio Phillips MD  Resident/CRNA: BREN Agarwal CRNA  Epidural  Patient position: sitting  Prep: ChloraPrep and site prepped and draped  Patient monitoring: cardiac monitor, continuous pulse ox and frequent blood pressure checks  Approach: midline  Location: L3-4  Injection technique: LORAINE saline  Provider prep: mask and sterile gloves  Needle  Needle type: Tuohy   Needle gauge: 17 G  Needle length: 3.5 in  Needle insertion depth: 5 cm  Catheter type: side hole  Catheter size: 19 G (20 G)  Catheter at skin depth: 10 cm  Test dose: negativeCatheter Secured: tegaderm  Assessment  Sensory level: T10  Hemodynamics: stable  Attempts: 1Outcomes: uncomplicated  Additional Notes  Called for labor epidural analgesia request. Medical and Surgical history reviewed with pt. Risks/benefits of epidural discussed including allergic reaction, infection, bleeding, hypotension, headache, back pain, nerve damage, failed or one-sided block. Also discussed anesthesia options and associated risks in the event of . Questions answered. Verbalizes understanding and requests to proceed. Pt in sitting position. Labor epidural placed using LORAINE sterile technique (donned mask and sterile gloves). Back prepped with Chloraprepx2. Sterile drape applied. Site: L3-4  LORAINE:   5 cm. Attempts:  1   Re-directs:  0  Site infiltrated with 1%Lidocaine. 17G Tuohy needle inserted, LORAINE technique with saline. No heme, CSF, or paresthesias noted. Epidural space dilated with saline. #25ga pencan needle placed through tuohy for dural puncture. +CSF -heme or paresthesia. Spinal needle removed. Threaded epidural catheter through Tuohy needle easily. Tuohy needle withdrawn. Test Dose:           Negative aspiration.  3cc of 1.5% Lido with epi 1:200,000 test dose given. Negative test dose. Skin:  Xcm catheter taped at the skin. Secured with steri strips, tegaderm, and tape. Infusion:  . 125% Bupivacaine with Fentanyl (2ug/cc)  Auto bolus 4 ml every 20 minutes. (Max. Dose- 40 ml/hr.)      Sensory Level:  R:  T10 L:  T10    VAS: start 6/10, end 2/10    Patient in supine position with left uterine displacement.  VSS:    Preanesthetic Checklist  Completed: patient identified, IV checked, site marked, risks and benefits discussed, surgical/procedural consents, equipment checked, pre-op evaluation, timeout performed, anesthesia consent given, oxygen available, monitors applied/VS acknowledged, fire risk safety assessment completed and verbalized and blood product R/B/A discussed and consented

## 2022-05-28 VITALS
OXYGEN SATURATION: 96 % | BODY MASS INDEX: 28.52 KG/M2 | HEIGHT: 62 IN | DIASTOLIC BLOOD PRESSURE: 73 MMHG | WEIGHT: 155 LBS | RESPIRATION RATE: 16 BRPM | SYSTOLIC BLOOD PRESSURE: 109 MMHG | TEMPERATURE: 96.6 F | HEART RATE: 87 BPM

## 2022-05-28 PROCEDURE — 6370000000 HC RX 637 (ALT 250 FOR IP): Performed by: OBSTETRICS & GYNECOLOGY

## 2022-05-28 RX ADMIN — IBUPROFEN 800 MG: 800 TABLET, FILM COATED ORAL at 02:48

## 2022-05-28 RX ADMIN — ACETAMINOPHEN 1000 MG: 500 TABLET ORAL at 08:36

## 2022-05-28 RX ADMIN — DOCUSATE SODIUM 100 MG: 100 CAPSULE, LIQUID FILLED ORAL at 08:36

## 2022-05-28 ASSESSMENT — LIFESTYLE VARIABLES: SMOKING_STATUS: 0

## 2022-05-28 ASSESSMENT — PAIN SCALES - GENERAL
PAINLEVEL_OUTOF10: 0
PAINLEVEL_OUTOF10: 1

## 2022-05-28 NOTE — LACTATION NOTE
This note was copied from a baby's chart. LC to bedside. DIANA stated she is concerned infant is not getting enough. Discussed infants stomach size, and ways to know infant is getting enough. Discussed normal infant feeding patterns in the first 24 and 2nd 24 hours and that the 2nd night infants typically cluster feed and are on and off the breast. DIANA  her last child for 3 years and he is now 10. Discussed when mature milk will start transitioning in.

## 2022-05-28 NOTE — ANESTHESIA PRE PROCEDURE
Department of Anesthesiology  Preprocedure Note       Name:  Elizabeth Mclaughlin   Age:  24 y.o.  :  2000                                          MRN:  [id number]         Date:  2022      Surgeon: * No surgeons listed *    Procedure: * No procedures listed *    Medications prior to admission:   Prior to Admission medications    Medication Sig Start Date End Date Taking? Authorizing Provider   ibuprofen (ADVIL;MOTRIN) 800 MG tablet Take 1 tablet by mouth every 8 hours as needed for Pain 22  Yes Portillo Hair MD   acetaminophen (TYLENOL) 500 MG tablet Take 2 tablets by mouth 3 times daily 22  Yes Portillo Hair MD   oxyCODONE (ROXICODONE) 5 MG immediate release tablet Take 1 tablet by mouth every 6 hours as needed for Pain for up to 3 days. Intended supply: 3 days.  Take lowest dose possible to manage pain 22 Yes Portillo Hair MD       Current medications:    Current Facility-Administered Medications   Medication Dose Route Frequency Provider Last Rate Last Admin    sodium chloride flush 0.9 % injection 5-40 mL  5-40 mL IntraVENous 2 times per day Portillo Hair MD        sodium chloride flush 0.9 % injection 5-40 mL  5-40 mL IntraVENous PRN Portillo Hair MD        0.9 % sodium chloride infusion   IntraVENous PRN Portillo Hair MD        acetaminophen (TYLENOL) tablet 1,000 mg  1,000 mg Oral Q8H Portillo Hair MD   1,000 mg at 22 0836    oxyCODONE (ROXICODONE) immediate release tablet 5 mg  5 mg Oral Q4H PRN Portillo Hair MD        Or    oxyCODONE (ROXICODONE) immediate release tablet 10 mg  10 mg Oral Q4H PRN Portillo Hair MD        ondansetron [facility]) injection 4 mg  4 mg IntraVENous Q6H PRN Portillo Hair MD        docusate sodium (COLACE) capsule 100 mg  100 mg Oral BID Portillo Hair MD   100 mg at 22 0836    polyethylene glycol (GLYCOLAX) packet 17 g  17 g Oral Daily PRN Portillo Hair MD        magnesium hydroxide BMI:   Wt Readings from Last 3 Encounters:   05/26/22 155 lb (70.3 kg)   06/07/18 104 lb 6 oz (47.3 kg) (9 %, Z= -1.36)*   09/28/16 115 lb (52.2 kg) (35 %, Z= -0.38)*     * Growth percentiles are based on [facility] (Girls, 2-20 Years) data. Body mass index is 28.35 kg/m². CBC:   Lab Results   Component Value Date    WBC 13.2 05/26/2022    RBC 4.02 05/26/2022    HGB 11.2 05/26/2022    HCT 34.5 05/26/2022    MCV 85.8 05/26/2022    RDW 13.5 05/26/2022     05/26/2022       CMP:   Lab Results   Component Value Date     06/07/2018    K 4.0 06/07/2018    CL 96 06/07/2018    CO2 19 06/07/2018    BUN 9 06/07/2018    CREATININE 0.7 06/07/2018    GFRAA >60 06/07/2018    AGRATIO 1.0 06/07/2018    LABGLOM >60 06/07/2018    GLUCOSE 104 06/07/2018    PROT 8.2 06/07/2018    CALCIUM 9.7 06/07/2018    BILITOT 0.5 06/07/2018    ALKPHOS 95 06/07/2018    AST 17 06/07/2018    ALT 6 06/07/2018       POC Tests: No results for input(s): POCGLU, POCNA, POCK, POCCL, POCBUN, POCHEMO, POCHCT in the last 72 hours.     Coags: No results found for: PROTIME, INR, APTT    HCG (If Applicable):   Lab Results   Component Value Date    PREGTESTUR Negative 06/07/2018        ABGs: No results found for: PHART, PO2ART, PYI3XBP, CED1XDW, BEART, V3NSTMGW     Type & Screen (If Applicable):  No results found for: LABABO, LABRH    Drug/Infectious Status (If Applicable):  No results found for: HIV, HEPCAB    COVID-19 Screening (If Applicable): No results found for: COVID19        Anesthesia Evaluation  Patient summary reviewed and Nursing notes reviewed no history of anesthetic complications:   Airway: Mallampati: II  TM distance: >3 FB   Neck ROM: full  Mouth opening: > = 3 FB   Dental: normal exam         Pulmonary:Negative Pulmonary ROS       (-) not a current smoker                           Cardiovascular:Negative CV ROS             Beta Blocker:  Not on Beta Blocker         Neuro/Psych:   Negative Neuro/Psych ROS GI/Hepatic/Renal: Neg GI/Hepatic/Renal ROS            Endo/Other: Negative Endo/Other ROS                    Abdominal:             Vascular: negative vascular ROS. Other Findings:           Anesthesia Plan      epidural     ASA 2             Anesthetic plan and risks discussed with patient. Use of blood products discussed with patient whom. Plan discussed with CRNA.                     BREN Yu - CRNA   5/28/2022

## 2022-05-28 NOTE — ANESTHESIA POSTPROCEDURE EVALUATION
Department of Anesthesiology  Postprocedure Note    Patient: Kameron Downs  MRN: [id number]  YOB: 2000  Date of evaluation: 5/28/2022  Time:  11:41 AM     Procedure Summary     Date: 05/26/22 Room / Location:     Anesthesia Start: 1946 Anesthesia Stop: 05/27/22 0009    Procedure: Labor Analgesia Diagnosis:     Scheduled Providers:  Responsible Provider: Yael La MD    Anesthesia Type: Not recorded ASA Status: Not recorded          Anesthesia Type: No value filed. Laureano Phase I: Laureano Score: 9    Laureano Phase II: Laureano Score: 10    Last vitals: Reviewed and per EMR flowsheets. Anesthesia Post Evaluation    Patient location during evaluation: floor  Level of consciousness: awake and alert  Pain score: 0  Airway patency: patent  Nausea & Vomiting: no nausea and no vomiting  Complications: no  Cardiovascular status: hemodynamically stable  Respiratory status: room air  Hydration status: stable  Multimodal analgesia pain management approach    Patient s/p epidural for L&D. Pt denies residual numbness post block. Patient is ambulating and voiding without difficulty. Patient denies back pain, headache, paresthesias, n/v or pruritus. Epidural site is free of signs of infection.

## 2022-05-28 NOTE — PROGRESS NOTES
[facility] post-partum note    Post-partum Day #1    Subjective:    No c/o  Lochia: Normal    Objective:  Vitals:    05/27/22 0930 05/27/22 1823 05/28/22 0045 05/28/22 0954   BP: 113/77 126/77 118/76 109/73   Pulse: 90 90 90 87   Resp: 16 15 16 16   Temp: 97.6 °F (36.4 °C) 97.3 °F (36.3 °C) 97.7 °F (36.5 °C) (!) 96.6 °F (35.9 °C)   TempSrc: Oral Oral Oral Oral   SpO2:   96%    Weight:       Height:           Physical Examination:  Appears well  Uterus: Firm, NT  Calves: NT    Labs:    Recent Labs     05/26/22  0930   WBC 13.2*   HGB 11.2*   HCT 34.5*        No results for input(s): NA, K, CL, CO2, BUN, CREATININE, CALCIUM, AST, ALT in the last 72 hours.     Invalid input(s): CHARLENE      Current Facility-Administered Medications:     sodium chloride flush 0.9 % injection 5-40 mL, 5-40 mL, IntraVENous, 2 times per day, Juan Jose Sutton MD    sodium chloride flush 0.9 % injection 5-40 mL, 5-40 mL, IntraVENous, PRN, Juan Jose Sutton MD    0.9 % sodium chloride infusion, , IntraVENous, PRN, Juan Jose Sutton MD    acetaminophen (TYLENOL) tablet 1,000 mg, 1,000 mg, Oral, Q8H, Juan Jose Sutton MD, 1,000 mg at 05/28/22 0836    oxyCODONE (ROXICODONE) immediate release tablet 5 mg, 5 mg, Oral, Q4H PRN **OR** oxyCODONE (ROXICODONE) immediate release tablet 10 mg, 10 mg, Oral, Q4H PRN, Juan Jose Sutton MD    ondansetron (ZOFRAN) injection 4 mg, 4 mg, IntraVENous, Q6H PRN, Juan Jose Sutton MD    docusate sodium (COLACE) capsule 100 mg, 100 mg, Oral, BID, Juan Jose Sutotn MD, 100 mg at 05/28/22 0836    polyethylene glycol (GLYCOLAX) packet 17 g, 17 g, Oral, Daily PRN, Juan Jose Sutton MD    magnesium hydroxide (MILK OF MAGNESIA) 400 MG/5ML suspension 30 mL, 30 mL, Oral, Daily PRN, Juan Jose Sutton MD    lansinoh lanolin ointment, , Topical, PRN, Juan Jose Sutton MD    witch hazel-glycerin (TUCKS) pad, , Topical, PRN, Juan Jose Sutton MD    hydrocortisone 2.5 % cream, , Topical, Q2H PRN, Janae Jackson MD    benzocaine-menthol (DERMOPLAST) 20-0.5 % spray, , Topical, PRN, Janae Jackson MD    ferrous sulfate (IRON 325) tablet 325 mg, 325 mg, Oral, BID WC, Janae Jackson MD    Tetanus-Diphth-Acell Pertussis (BOOSTRIX) injection 0.5 mL, 0.5 mL, IntraMUSCular, Prior to discharge, Janae Jackson MD    ibuprofen (ADVIL;MOTRIN) tablet 800 mg, 800 mg, Oral, Q8H, Janae Jackson MD, 800 mg at 05/28/22 0248     Assessment/Plan:      Post Partum: advance Postpartum care  Discharge today yes

## 2022-05-28 NOTE — LACTATION NOTE
This note was copied from a baby's chart. LC to bedside. MOB stated infant is starting to latch better. MOB reports the last few feedings she has been able to get infant latched by herself and she is not having any pain during the feeding. Discussed ways to know if infant is getting enough and wet and dirty diapers infant should be having each day. MOB has a pump. MOB nipples are a little sore. Discussed nipple care and expressed colostrum and using lanolin after feedings. No other questions at this time.

## 2022-05-28 NOTE — PLAN OF CARE
Problem: Vaginal Birth or  Section  Goal: Fetal and maternal status remain reassuring during the birth process  Outcome: Completed     Problem: Pain  Goal: Verbalizes/displays adequate comfort level or baseline comfort level  Outcome: Completed  Flowsheets  Taken 2022 0954 by Shaw Hines RN  Verbalizes/displays adequate comfort level or baseline comfort level:   Encourage patient to monitor pain and request assistance   Assess pain using appropriate pain scale   Administer analgesics based on type and severity of pain and evaluate response  Taken 2022 0045 by Yeny Suarez RN  Verbalizes/displays adequate comfort level or baseline comfort level: Encourage patient to monitor pain and request assistance

## 2022-06-01 NOTE — ADT AUTH CERT
Jean Lung #[id number] ([id number]) (24 y.o.  F) (Adm: 22)  MHFZ [id number]    Delivery Summary      Jean Teran [[id number]]   (22 to present)  Birth Date: [birth year] Age (as of 22): 21 Ethnicity: Non- / Non  Race: White (non-)   History:  Estimated Date of Delivery: 22 Gestational Age: 36w3d Blood Type: B POS     OB History       1    Para   1    Term   1            AB        Living   1      SAB        IAB        Ectopic        Molar        Multiple   0    Live Births   1         # Outcome Date GA Labor/2nd Weight Sex Delivery Anes PTL Lv A1 A5   1 Term 22 39w1d 5h 43m / 0h 41m 6 lb 2.9 oz (2.805 kg) F Vag-Spont Epidural N Living 8 9   Name: Edgar Faust   Location: Other   Delivering Clinician: Gen Ware MD        Dating Summary    Working KEELY: 22 set by Divina Person RN on 22 based on Patient Reported     Based On KEELY [address] User Date   Patient Reported 22 Working  Divina Person RN 22     Prenatal Results      1st Trimester    Test Value Reference Range Date Time   ABO/Rh  B POS   22 1025   Antibody       HCT  43.7 % 36.0 - 48.0 10/05/21 1340   HGB  14.6 g/dL 12.0 - 16.0 10/05/21 1340   Rubella       RPR       Urine Prot       HBsAg       HIV       Gonorrhea       Chlamydia       TSH       TB       Pap   (See Report)  10/05/21 0900     2nd Trimester    Test Value Reference Range Date Time   HCT  34.9 % 34.0 - 49.0 22 0848   HGB  11.7 G/DL 11.2 - 15.7 22 0848   Glucose  104 mg/dL Important  70 - 99 18 0832     3rd Trimester    Test Value Reference Range Date Time   HCT  34.5 % Important  36.0 - 48.0 22 0930   HGB  11.2 g/dL Important  12.0 - 16.0 22 0930   GBS          Genetic Screening    Test Value Reference Range Date Time   BUN  9 mg/dL 7 - 20 18 2038   Cystic Fibrosis       Canavan       Thalessemia       Sickle Cell [facility]         External Labs    Test Value Reference Range Date Time   ABO       Rh Factor       Rhogam       HIV       RPR       Rubella Titer       Hepatitis B       C. Trachomatis       N.  Gonorrhoeae       GBS * Not detected   22    Hepatitis C Antibody            Legend    *: Historical  View all results for this pregnancy         Alistair Santos [[id number]]      [place] Information    Head delivery date/time: 2022 00:09:00   Changing the 's delivery date/time could affect patient care.:    Delivery date/time:  22 0009   Delivery type: Vaginal, Spontaneous   Details:       Start Pushing    Labor onset date/time: 22 17:45:00 EDT Now   Dilation complete date/time: 22 23:28:00 EDT Now   Start pushing date/time: 2022 23:31:00   Decision date/time (emergent ):      Labor Length    1st stage: 5h 43m  2nd stage: 0h 41m  3rd stage: 0h 05m    Delivery Providers    Delivering clinician: Gia De Los Santos MD  Provider Role   Gia De Los Santos MD Obstetrician   Tuan Tam, RN Primary Nurse   Evanabil Valente, RN Primary [place] Nurse    NICU Nurse    Neonatologist    Anesthesiologist    Nurse Anesthetist    Nurse Practitioner    Midwife    Nursery Nurse   Denisha Muñoz [place] Tech     Anesthesia    Method: Epidural    Presentation    Presentation: Vertex  Position: Occiput Anterior    Operative Delivery    Forceps attempted?: No  Vacuum extractor attempted?: No    Shoulder Dystocia    Shoulder dystocia present?: No     Measurements    Weight: 2805 g Length: 51 cm   Head circumference: 31 cm      Lacerations    Episiotomy: None  Perineal laceration: None  Labial laceration?: Yes  Labial laceration location: left  Labial laceration repaired?: Yes  Number of repair packets: 1    Placenta    Placenta delivery date/time: 2022 0014  Placenta removal: Spontaneous  Placenta appearance: Intact  Placenta disposition: Placenta Refrigerator    Vaginal Counts    Initial count personnel: DAMIAN MARTINEZ  Initial count verified by: NIRAJ WALDRON RN    Spong[employer]   Initial counts Correct  Correct   Final counts Correct Correct Correct   Final count personnel: DAMIAN MARTINEZ  Final count verified by: Darling Rodriguez RN  Accurate final count?: Yes    Skin to Skin    Reason skin to skin not initiated: Patient Refused

## 2024-02-06 LAB
ANION GAP SERPL CALCULATED.3IONS-SCNC: 10 MMOL/L (ref 7–16)
BUN BLDV-MCNC: 5 MG/DL (ref 6–20)
CALCIUM SERPL-MCNC: 9 MG/DL (ref 8.6–10.4)
CHLORIDE BLD-SCNC: 103 MEQ/L (ref 98–107)
CO2: 25 MMOL/L (ref 22–29)
CREAT SERPL-MCNC: 0.61 MG/DL (ref 0.51–1.3)
EGFR (CKD-EPI): 127 ML/MIN/1.73 M2
GLUCOSE BLD-MCNC: 81 MG/DL (ref 70–99)
HCT VFR BLD CALC: 41.8 % (ref 34–45)
HEMOGLOBIN: 13.6 G/DL (ref 11.2–15.7)
MCH RBC QN AUTO: 29.7 PG (ref 26–34)
MCHC RBC AUTO-ENTMCNC: 32.5 G/DL (ref 30.7–35.5)
MCV RBC AUTO: 91.3 FL (ref 80–100)
PDW BLD-RTO: 12.6 %
PLATELET # BLD: 180 X10(3)/MCL (ref 155–369)
PMV BLD AUTO: 10.9 FL (ref 8.8–12.5)
POTASSIUM SERPL-SCNC: 4 MEQ/L (ref 3.5–5)
RBC # BLD: 4.58 X10(6)/MCL (ref 3.9–5.2)
SODIUM BLD-SCNC: 138 MEQ/L (ref 136–145)
TSH ULTRASENSITIVE: 0.69 MCIU/ML (ref 0.27–4.2)
WBC # BLD: 11.3 X10(3)/MCL (ref 3.7–10.3)